# Patient Record
Sex: FEMALE | Race: WHITE | HISPANIC OR LATINO | Employment: FULL TIME | URBAN - METROPOLITAN AREA
[De-identification: names, ages, dates, MRNs, and addresses within clinical notes are randomized per-mention and may not be internally consistent; named-entity substitution may affect disease eponyms.]

---

## 2020-01-17 ENCOUNTER — OFFICE VISIT (OUTPATIENT)
Dept: URGENT CARE | Facility: CLINIC | Age: 50
End: 2020-01-17
Payer: COMMERCIAL

## 2020-01-18 ENCOUNTER — OFFICE VISIT (OUTPATIENT)
Dept: URGENT CARE | Facility: CLINIC | Age: 50
End: 2020-01-18
Payer: COMMERCIAL

## 2020-01-18 VITALS
TEMPERATURE: 98.1 F | BODY MASS INDEX: 43.09 KG/M2 | OXYGEN SATURATION: 96 % | RESPIRATION RATE: 16 BRPM | WEIGHT: 252.4 LBS | DIASTOLIC BLOOD PRESSURE: 82 MMHG | HEIGHT: 64 IN | SYSTOLIC BLOOD PRESSURE: 132 MMHG | HEART RATE: 74 BPM

## 2020-01-18 DIAGNOSIS — J06.9 UPPER RESPIRATORY INFECTION WITH COUGH AND CONGESTION: ICD-10-CM

## 2020-01-18 DIAGNOSIS — J01.10 ACUTE NON-RECURRENT FRONTAL SINUSITIS: ICD-10-CM

## 2020-01-18 PROCEDURE — 99204 OFFICE O/P NEW MOD 45 MIN: CPT | Performed by: NURSE PRACTITIONER

## 2020-01-18 RX ORDER — BENZONATATE 100 MG/1
100 CAPSULE ORAL 3 TIMES DAILY PRN
Qty: 30 CAP | Refills: 0 | Status: SHIPPED | OUTPATIENT
Start: 2020-01-18 | End: 2020-09-29

## 2020-01-18 RX ORDER — FLUTICASONE PROPIONATE 50 MCG
1 SPRAY, SUSPENSION (ML) NASAL 2 TIMES DAILY
Qty: 16 G | Refills: 0 | Status: SHIPPED | OUTPATIENT
Start: 2020-01-18

## 2020-01-18 RX ORDER — CODEINE PHOSPHATE AND GUAIFENESIN 10; 100 MG/5ML; MG/5ML
5-10 SOLUTION ORAL
Qty: 120 ML | Refills: 0 | Status: SHIPPED | OUTPATIENT
Start: 2020-01-18 | End: 2020-01-28

## 2020-01-18 RX ORDER — AMOXICILLIN AND CLAVULANATE POTASSIUM 875; 125 MG/1; MG/1
1 TABLET, FILM COATED ORAL 2 TIMES DAILY
Qty: 14 TAB | Refills: 0 | Status: SHIPPED | OUTPATIENT
Start: 2020-01-18 | End: 2020-01-25

## 2020-01-18 NOTE — PROGRESS NOTES
Chief Complaint   Patient presents with   • Otalgia     x2 weeks, cough, congestion, headache        HISTORY OF PRESENT ILLNESS: Patient is a 49 y.o. female who presents today due to 10 days of nasal congestion, fever, chills, headache, ear pressure, cough, and sinus pressure. She denies associated difficulty breathing, confusion, nausea, vomiting or diarrhea. She has tried OTC cold/sinus medication at home without much improvement. Denies a history of seasonal allergies or sinus infections in the past. No known ill contacts at home. No recent antibiotic usage.     There are no active problems to display for this patient.      Allergies:Patient has no known allergies.    Current Outpatient Medications Ordered in Epic   Medication Sig Dispense Refill   • amoxicillin-clavulanate (AUGMENTIN) 875-125 MG Tab Take 1 Tab by mouth 2 times a day for 7 days. 14 Tab 0   • fluticasone (FLONASE) 50 MCG/ACT nasal spray Spray 1 Spray in nose 2 times a day. 16 g 0   • benzonatate (TESSALON) 100 MG Cap Take 1 Cap by mouth 3 times a day as needed. 30 Cap 0   • guaifenesin-codeine (ROBITUSSIN AC) Solution oral solution Take 5-10 mL by mouth at bedtime as needed for Cough for up to 10 days. Do not drive, work, drink alcohol or engaged in potentially hazardous activity while taking this medication. 120 mL 0     No current Epic-ordered facility-administered medications on file.        History reviewed. No pertinent past medical history.    Social History     Tobacco Use   • Smoking status: Never Smoker   • Smokeless tobacco: Never Used   Substance Use Topics   • Alcohol use: Not on file   • Drug use: Not on file       No family status information on file.   History reviewed. No pertinent family history.    ROS:  Review of Systems   Constitutional: Positive for fever, chills, fatigue. Negative for weight loss.   HENT: Positive for ear pressure, sinus pressure, sore throat, nasal congestion. Negative for ear pain, nosebleeds, neck pain.   "  Eyes: Negative for vision changes.   Neuro: Positive for headache. Negative for sensory changes, weakness, seizure, LOC.  Cardiovascular: Negative for chest pain, palpitations, orthopnea and leg swelling.   Respiratory: Positive for cough. Negative for shortness of breath and wheezing.   Gastrointestinal: Negative for abdominal pain, nausea, vomiting or diarrhea.    Skin: Negative for rash, diaphoresis.     Exam:  /82 (Patient Position: Sitting)   Pulse 74   Temp 36.7 °C (98.1 °F) (Temporal)   Resp 16   Ht 1.626 m (5' 4\")   Wt 114.5 kg (252 lb 6.4 oz)   SpO2 96%   General: well-nourished, well-developed female in NAD  Head: normocephalic, atraumatic  Eyes: PERRLA, no conjunctival injection, acuity grossly intact, lids normal.  Ears: normal shape and symmetry, no tenderness, no discharge. External canals are without any significant edema or erythema. Tympanic membranes are without any inflammation, no effusion. Gross auditory acuity is intact.  Nose: symmetrical without tenderness, erythema and swelling noted bilateral turbinates, clear discharge. Frontal sinus tenderness.   Mouth/Throat: reasonable hygiene, no exudates or tonsillar enlargement. Erythema is present.   Neck: no masses, range of motion within normal limits, no tracheal deviation. No obvious thyroid enlargement.   Lymph: no cervical adenopathy. No supraclavicular adenopathy.   Neuro: alert and oriented. Cranial nerves 1-12 grossly intact. No sensory deficit.   Cardiovascular: regular rate and rhythm. No edema.  Pulmonary: no distress. Chest is symmetrical with respiration, no wheezes, crackles, or rhonchi.   Musculoskeletal: no clubbing, appropriate muscle tone, gait is stable.  Skin: warm, dry, intact, no clubbing, no cyanosis, no rashes.   Psych: appropriate mood, affect, judgement.         Assessment/Plan:  1. Upper respiratory infection with cough and congestion  fluticasone (FLONASE) 50 MCG/ACT nasal spray    benzonatate (TESSALON) " 100 MG Cap    guaifenesin-codeine (ROBITUSSIN AC) Solution oral solution   2. Acute non-recurrent frontal sinusitis  amoxicillin-clavulanate (AUGMENTIN) 875-125 MG Tab    fluticasone (FLONASE) 50 MCG/ACT nasal spray       Suspect URI with secondary sinusitis.  Augmentin and Flonase as directed.  Tessalon Perles and Robitussin-AC for cough, sedative effects of codeine discussed.  Probiotic use encouraged.   Nasal washes with sterile saline solution daily. Sleep with HOB elevated, humidifier at night, rest, increase fluid intake.   Supportive care, differential diagnoses, and indications for immediate follow-up discussed with patient.   Pathogenesis of diagnosis discussed including typical length and natural progression.   Instructed to return to clinic or nearest emergency department for any change in condition, further concerns, or worsening of symptoms.  Patient states understanding of the plan of care and discharge instructions.  Instructed to make an appointment, for follow up, with her primary care provider.        Please note that this dictation was created using voice recognition software. I have made every reasonable attempt to correct obvious errors, but I expect that there are errors of grammar and possibly content that I did not discover before finalizing the note.      ARLENE Chavez.

## 2020-09-19 ENCOUNTER — HOSPITAL ENCOUNTER (OUTPATIENT)
Facility: MEDICAL CENTER | Age: 50
End: 2020-09-19
Attending: PHYSICIAN ASSISTANT
Payer: COMMERCIAL

## 2020-09-19 ENCOUNTER — OFFICE VISIT (OUTPATIENT)
Dept: URGENT CARE | Facility: CLINIC | Age: 50
End: 2020-09-19
Payer: COMMERCIAL

## 2020-09-19 VITALS
HEIGHT: 64 IN | BODY MASS INDEX: 42.68 KG/M2 | SYSTOLIC BLOOD PRESSURE: 120 MMHG | TEMPERATURE: 96.5 F | OXYGEN SATURATION: 96 % | RESPIRATION RATE: 14 BRPM | HEART RATE: 63 BPM | WEIGHT: 250 LBS | DIASTOLIC BLOOD PRESSURE: 100 MMHG

## 2020-09-19 DIAGNOSIS — J02.9 PHARYNGITIS, UNSPECIFIED ETIOLOGY: ICD-10-CM

## 2020-09-19 DIAGNOSIS — R05.9 COUGH: ICD-10-CM

## 2020-09-19 DIAGNOSIS — R09.82 PND (POST-NASAL DRIP): ICD-10-CM

## 2020-09-19 DIAGNOSIS — I10 HYPERTENSION, UNSPECIFIED TYPE: ICD-10-CM

## 2020-09-19 DIAGNOSIS — J98.01 BRONCHOSPASM: ICD-10-CM

## 2020-09-19 LAB
INT CON NEG: NORMAL
INT CON POS: NORMAL
S PYO AG THROAT QL: NORMAL

## 2020-09-19 PROCEDURE — 99214 OFFICE O/P EST MOD 30 MIN: CPT | Performed by: PHYSICIAN ASSISTANT

## 2020-09-19 PROCEDURE — 87880 STREP A ASSAY W/OPTIC: CPT | Performed by: PHYSICIAN ASSISTANT

## 2020-09-19 PROCEDURE — U0003 INFECTIOUS AGENT DETECTION BY NUCLEIC ACID (DNA OR RNA); SEVERE ACUTE RESPIRATORY SYNDROME CORONAVIRUS 2 (SARS-COV-2) (CORONAVIRUS DISEASE [COVID-19]), AMPLIFIED PROBE TECHNIQUE, MAKING USE OF HIGH THROUGHPUT TECHNOLOGIES AS DESCRIBED BY CMS-2020-01-R: HCPCS

## 2020-09-19 RX ORDER — LIDOCAINE HYDROCHLORIDE 20 MG/ML
5 SOLUTION OROPHARYNGEAL PRN
Qty: 120 ML | Refills: 0 | Status: SHIPPED | OUTPATIENT
Start: 2020-09-19 | End: 2020-09-29

## 2020-09-19 RX ORDER — BENZONATATE 100 MG/1
100 CAPSULE ORAL 3 TIMES DAILY PRN
Qty: 60 CAP | Refills: 0 | Status: SHIPPED | OUTPATIENT
Start: 2020-09-19 | End: 2020-09-29

## 2020-09-19 RX ORDER — ALBUTEROL SULFATE 90 UG/1
2 AEROSOL, METERED RESPIRATORY (INHALATION) EVERY 6 HOURS PRN
Qty: 8.5 G | Refills: 2 | Status: SHIPPED | OUTPATIENT
Start: 2020-09-19 | End: 2020-09-29

## 2020-09-19 RX ORDER — ONDANSETRON 4 MG/1
4 TABLET, FILM COATED ORAL EVERY 6 HOURS PRN
Qty: 20 TAB | Refills: 1 | Status: SHIPPED | OUTPATIENT
Start: 2020-09-19 | End: 2020-09-29

## 2020-09-19 ASSESSMENT — ENCOUNTER SYMPTOMS
WHEEZING: 1
SPUTUM PRODUCTION: 0
CHILLS: 1
ABDOMINAL PAIN: 0
FEVER: 0
SORE THROAT: 1
DIARRHEA: 0
VOMITING: 0
NAUSEA: 0
COUGH: 1
SINUS PAIN: 0
SHORTNESS OF BREATH: 0

## 2020-09-19 NOTE — LETTER
September 19, 2020       Patient: Jessica Ham   YOB: 1970   Date of Visit: 9/19/2020       To Whom it May Concern,   Your employee was seen in our clinic today. A concern for COVID-19 has been identified and testing is in progress.?   ?  We are asking you to excuse absences while following self-isolation protocol per Center for Disease Control (CDC) guidelines. Your employee will be able to access test results through our electronic delivery system called [employer].?   ?  If the results of testing are negative, and once there has been no fever (temperature >100.4 F) for at least 72 hours without treatment, and no vomiting or diarrhea for at least 48 hours, then return to work is approved.   ?  If the results of testing are positive then your employee will be contacted by the [facility] or [facility] department for further instructions on duration of self-isolation and return to work protocol. In general, this will also follow the CDC guidelines with a minimum of 10 days from the onset of symptoms and without fever, vomiting, or diarrhea as above.?   ?  In general, repeat testing is not necessary and not offered through our [facility] care.?   ?  This is the only note that will be provided from [facility] for this visit. Your employee will require an appointment with a primary care provider if FMLA or disability forms are required.   ?  Sincerely,?     Joel Nicole P.A.-C.  Electronically Signed

## 2020-09-19 NOTE — PROGRESS NOTES
"Subjective:   Jessica Ham  is a 50 y.o. female who presents for Sore Throat ( light cough, dizzie, nausea, no fever, x 9 days yesterday was her worse day, last night laying down is good, but getting up she feels pain )      HPI    Patient comes clinic complaining last 8 to 9 days of noting sore throat worse in the morning, light cough with trace wheezing.  Notes some sinus congestion causing intermittent dizziness.  Notes morning time nausea without vomiting.  She denies fever but notes some fatigue.  She denies change in smell but has had some \"moments of abnormal taste\".  She denies known exposure to strep or COVID.  She states she did leave the home just over a week ago to go shopping but besides that has been very diligent avoiding exposure to others.  She denies history of asthma bronchitis or pneumonia.  She denies historical use of MDI.  She denies abdominal pain diarrhea or rash.  She has been taking Zyrtec.    Review of Systems   Constitutional: Positive for chills and malaise/fatigue. Negative for fever.   HENT: Positive for congestion and sore throat. Negative for ear pain and sinus pain.    Respiratory: Positive for cough and wheezing. Negative for sputum production and shortness of breath.    Gastrointestinal: Negative for abdominal pain, diarrhea, nausea and vomiting.   Skin: Negative for rash.   Endo/Heme/Allergies: Positive for environmental allergies.       No Known Allergies     Objective:   /100   Pulse 63   Temp 35.8 °C (96.5 °F) (Temporal)   Resp 14   Ht 1.626 m (5' 4\")   Wt 113.4 kg (250 lb)   SpO2 96%   BMI 42.91 kg/m²     Physical Exam  Vitals signs and nursing note reviewed.   Constitutional:       General: She is not in acute distress.     Appearance: She is well-developed. She is not diaphoretic.   HENT:      Head: Normocephalic and atraumatic.      Right Ear: Tympanic membrane, ear canal and external ear normal.      Left Ear: Tympanic membrane, ear canal and " external ear normal.      Nose: Nose normal.      Right Sinus: No maxillary sinus tenderness or frontal sinus tenderness.      Left Sinus: No maxillary sinus tenderness or frontal sinus tenderness.      Mouth/Throat:      Lips: Pink.      Mouth: Mucous membranes are moist.      Pharynx: Uvula midline. Posterior oropharyngeal erythema ( mild PND) present. No oropharyngeal exudate.      Tonsils: No tonsillar abscesses.   Eyes:      General: Lids are normal. No scleral icterus.        Right eye: No discharge.         Left eye: No discharge.      Conjunctiva/sclera: Conjunctivae normal.      Pupils: Pupils are equal, round, and reactive to light.   Neck:      Musculoskeletal: Neck supple.   Pulmonary:      Effort: Pulmonary effort is normal. No respiratory distress.      Breath sounds: No decreased breath sounds, wheezing, rhonchi or rales.   Musculoskeletal: Normal range of motion.   Lymphadenopathy:      Cervical: Cervical adenopathy ( mild bilat) present.   Skin:     General: Skin is warm and dry.      Coloration: Skin is not pale.      Findings: No erythema.   Neurological:      Mental Status: She is alert and oriented to person, place, and time. She is not disoriented.   Psychiatric:         Speech: Speech normal.         Behavior: Behavior normal.     POCT strep - NEG  COVID pending    Assessment/Plan:   1. Pharyngitis, unspecified etiology  - POCT Rapid Strep A  - COVID/SARS COV-2 PCR; Future  - lidocaine (XYLOCAINE) 2 % Solution; Take 5 mL by mouth as needed for Throat/Mouth Pain (q6hr PRN throat pain, ok to rinse and spit or swallow).  Dispense: 120 mL; Refill: 0    2. Cough  - benzonatate (TESSALON) 100 MG Cap; Take 1 Cap by mouth 3 times a day as needed for Cough.  Dispense: 60 Cap; Refill: 0    3. PND (post-nasal drip)  - ondansetron (ZOFRAN) 4 MG Tab tablet; Take 1 Tab by mouth every 6 hours as needed for Nausea/Vomiting.  Dispense: 20 Tab; Refill: 1    4. Bronchospasm  - albuterol 108 (90 Base) MCG/ACT  Aero Soln inhalation aerosol; Inhale 2 Puffs by mouth every 6 hours as needed for Shortness of Breath.  Dispense: 8.5 g; Refill: 2  Supportive care is reviewed with patient/caregiver - recommend to push PO fluids and electrolytes, over-the-counter symptom support medications reviewed, ER precautions with worsened symptoms, quarantine recommendations reviewed, sent with letter, MyChart for results of testing  Return to clinic with lack of resolution or progression of symptoms.  ER precautions with any worsening symptoms are reviewed with patient/caregiver and they do express understanding      I have worn an N95 mask, gloves and eye protection for the entire encounter with this patient.     Differential diagnosis, natural history, supportive care, and indications for immediate follow-up discussed.

## 2020-09-20 DIAGNOSIS — J02.9 PHARYNGITIS, UNSPECIFIED ETIOLOGY: ICD-10-CM

## 2020-09-20 LAB
COVID ORDER STATUS COVID19: NORMAL
SARS-COV-2 RNA RESP QL NAA+PROBE: NOTDETECTED
SPECIMEN SOURCE: NORMAL

## 2020-09-22 ENCOUNTER — TELEPHONE (OUTPATIENT)
Dept: SCHEDULING | Facility: IMAGING CENTER | Age: 50
End: 2020-09-22

## 2020-09-29 ENCOUNTER — OFFICE VISIT (OUTPATIENT)
Dept: MEDICAL GROUP | Age: 50
End: 2020-09-29
Payer: COMMERCIAL

## 2020-09-29 VITALS
HEART RATE: 86 BPM | BODY MASS INDEX: 43.71 KG/M2 | HEIGHT: 64 IN | OXYGEN SATURATION: 97 % | TEMPERATURE: 97.1 F | SYSTOLIC BLOOD PRESSURE: 122 MMHG | WEIGHT: 256 LBS | DIASTOLIC BLOOD PRESSURE: 98 MMHG

## 2020-09-29 DIAGNOSIS — M79.89 LEG SWELLING: ICD-10-CM

## 2020-09-29 DIAGNOSIS — E78.5 DYSLIPIDEMIA: ICD-10-CM

## 2020-09-29 DIAGNOSIS — Z71.85 IMMUNIZATION COUNSELING: ICD-10-CM

## 2020-09-29 DIAGNOSIS — I10 ESSENTIAL HYPERTENSION: ICD-10-CM

## 2020-09-29 DIAGNOSIS — R53.83 FATIGUE, UNSPECIFIED TYPE: ICD-10-CM

## 2020-09-29 DIAGNOSIS — Z00.00 ANNUAL PHYSICAL EXAM: ICD-10-CM

## 2020-09-29 DIAGNOSIS — R73.01 IFG (IMPAIRED FASTING GLUCOSE): ICD-10-CM

## 2020-09-29 DIAGNOSIS — Z00.00 HEALTH CARE MAINTENANCE: ICD-10-CM

## 2020-09-29 DIAGNOSIS — E55.9 HYPOVITAMINOSIS D: ICD-10-CM

## 2020-09-29 PROCEDURE — 99386 PREV VISIT NEW AGE 40-64: CPT | Performed by: INTERNAL MEDICINE

## 2020-09-29 PROCEDURE — 99214 OFFICE O/P EST MOD 30 MIN: CPT | Mod: 25 | Performed by: INTERNAL MEDICINE

## 2020-09-29 RX ORDER — HYDROCHLOROTHIAZIDE 25 MG/1
25 TABLET ORAL DAILY
Qty: 30 TAB | Refills: 0 | Status: SHIPPED | OUTPATIENT
Start: 2020-09-29 | End: 2020-10-23 | Stop reason: SDUPTHER

## 2020-09-29 ASSESSMENT — PATIENT HEALTH QUESTIONNAIRE - PHQ9: CLINICAL INTERPRETATION OF PHQ2 SCORE: 0

## 2020-09-29 NOTE — PROGRESS NOTES
CHIEF COMPLAINT  Chief Complaint   Patient presents with   • Establish Care   HTN, labs    HPI  Jessica Ham is a 50 y.o. female who presents today for the following     HCM  Recommendations:  Regular exercise at least 4 days a week  Diet: advised balanced diet  Dental exam at least 1-2 times per year  Sunscreen use: advised    Immunization counseling:  TdaP:  due  Influenza: due, NA in office    GYN  St post hysterectomy in 2017, - no cancer  Last Mammography: pending    Hypertensio, LE swelling  Meds: probably dieuretic, stopped in 1/2020.  C/o water retention, LE swelling.  Denies:  -  headaches, vision problems, tinnitus.                 -  chest pain/pressure, palpitations, irregular heart beats, exertional, dyspnea, peripheral edema.  Low salt diet: advised  Diet: advised low lester  Exercise: advised daily  BMI: 43  FH of HTN: mother    Dyslipidemia  The patient had abnormal lipid panel, no medications.  Diet /Exercise/BMI:  As above  FH: mother    IFG  The patient had elevated FBG, and A1c.  No polydipsia, polyphagia, polyuria.  No abdominal pain, weight loss, fatigue.  Diet/exercise/BMI: As above  She was on metformin in the past.  FH of DM: multiple    Hypovitaminosis D  The patient had low vitamin D level.  Vitamin D supplement: no.    Obesity, Body mass index is 43.94 kg/m².  Onset: since her 20'  Diet: regular  Exercise: insufficient  No temperature intolerance. No change in hair/skin quality, BMs.   No HTN, buffalo hump, purple striae, flushing.  FH of obesity: sisters x 2, mother    Reviewed PMH, PSH, FH, SH, ALL, HCM/IMM  Reviewed MEDS    Patient Active Problem List    Diagnosis Date Noted   • Health care maintenance 09/29/2020   • Immunization counseling 09/29/2020   • Essential hypertension 09/29/2020   • Dyslipidemia 09/29/2020   • IFG (impaired fasting glucose) 09/29/2020   • BMI 40.0-44.9, adult (HCC) 09/29/2020     CURRENT MEDICATIONS  Current Outpatient Medications   Medication Sig  "Dispense Refill   • fluticasone (FLONASE) 50 MCG/ACT nasal spray Spray 1 Spray in nose 2 times a day. 16 g 0     No current facility-administered medications for this visit.      ALLERGIES  Allergies: Patient has no known allergies.  PAST MEDICAL HISTORY  Past Medical History:   Diagnosis Date   • Allergy    • Anxiety    • Hypertension    • Overweight    • RAD (reactive airway disease)      SURGICAL HISTORY  She  has a past surgical history that includes abdominal hysterectomy total.  SOCIAL HISTORY  Social History     Tobacco Use   • Smoking status: Never Smoker   • Smokeless tobacco: Never Used   Substance Use Topics   • Alcohol use: Not on file   • Drug use: Not on file     Social History     Social History Narrative   • Not on file     FAMILY HISTORY  Family History   Problem Relation Age of Onset   • Diabetes Mother    • Hypertension Mother    • Hyperlipidemia Mother    • Diabetes Sister    • Diabetes Brother      Family Status   Relation Name Status   • Mo  (Not Specified)   • Sis  (Not Specified)   • Bro  (Not Specified)     ROS   Constitutional: Negative for fever, chills, fatigue.  HENT: Negative for congestion, sore throat.  Eyes: Negative for vision problems.   Respiratory: Negative for cough, shortness of breath.  Cardiovascular: Negative for chest pain, palpitations.   Gastrointestinal: Negative for heartburn, nausea, abdominal pain.   Genitourinary: Negative for dysuria.  Musculoskeletal: Negative for significant myalgia, back and joint pain.   Skin: Negative for rash.   Neuro: Negative for dizziness, weakness and headaches.   Endo/Heme/Allergies: Does not bruise/bleed easily.   Psychiatric/Behavioral: Negative for depression.    PHYSICAL EXAM   Blood Pressure 122/98 (BP Location: Left arm, Patient Position: Sitting, BP Cuff Size: Adult)   Pulse 86   Temperature 36.2 °C (97.1 °F) (Temporal)   Height 1.626 m (5' 4\")   Weight 116.1 kg (256 lb)   Oxygen Saturation 97%  Body mass index is 43.94 " kg/m².  General:  NAD, well appearing  HEENT:   NC/AT, PERRLA, EOMI.  Cardiovascular: unlabored breathing, no peripheral cyanosis or swelling.     Lungs:   no respiratory distress.  Abdomen: non- distended.  Extremities:  B/L ankle swelling.  Skin:  Warm, dry.  No erythema. No rash.   Neurologic: Alert & oriented x 3. CN II-XII grossly intact. No focal deficits.  Psychiatric:  Affect normal, mood normal, judgment normal.    Labs     Labs are reviewed and discussed with a patient    Covid test, 9/19/20: negative    Imaging     None    Assessment and Plan     Jessica Ham is a 50 y.o. female    1. Annual physical exam  Reviewed PMH, PSH, FH, SH, ALL, MEDS, HCM/IMM.   Advised healthy habits, diet, exercise.    2. Health care maintenance  3. Immunization counseling  Per HPI    4. Essential hypertension  High DBP, start HCTZ  - REFERRAL TO [facility] (HIP) Services Requested: Registered Dietitian for Medical Nutrition Therapy; Reason for Visit: Overweight/Obesity, BMI of 25 or higher and A1C of 5.7-6.4  - Comp Metabolic Panel; Future  - hydroCHLOROthiazide (HYDRODIURIL) 25 MG Tab; Take 1 Tab by mouth every day.  Dispense: 30 Tab; Refill: 0  5. Leg swelling  - hydroCHLOROthiazide (HYDRODIURIL) 25 MG Tab; Take 1 Tab by mouth every day.  Dispense: 30 Tab; Refill: 0    6. Dyslipidemia  Pending labs, advised low-calorie diet, daily exercise, weight control  - REFERRAL TO [facility] (HIP) Services Requested: Registered Dietitian for Medical Nutrition Therapy; Reason for Visit: Overweight/Obesity, BMI of 25 or higher and A1C of 5.7-6.4  - Comp Metabolic Panel; Future  - Lipid Profile; Future    7. IFG (impaired fasting glucose)  Discussed about risk to develop DM.   Advised low carb diet, exercise, watch for WT.   - REFERRAL TO [facility] (HIP) Services Requested: Registered Dietitian for Medical Nutrition Therapy; Reason for Visit:  Overweight/Obesity, BMI of 25 or higher and A1C of 5.7-6.4  - Comp Metabolic Panel; Future    8. Hypovitaminosis D  Pending labs  - VITAMIN D,25 HYDROXY; Future    9. Fatigue, unspecified type  Pending labs  - CBC WITH DIFFERENTIAL; Future  - TSH WITH REFLEX TO FT4; Future    10. BMI 40.0-44.9, adult (HCC)  Requested referral for nutritionist  - REFERRAL TO [facility] (HIP) Services Requested: Registered Dietitian for Medical Nutrition Therapy; Reason for Visit: Overweight/Obesity, BMI of 25 or higher and A1C of 5.7-6.4    Counseling:   - Smoking:  Nonsmoker    Followup: within 4 weeks, labs, EKG, pelvic exam    All questions are answered.    Please note that this dictation was created using voice recognition software, and that there might be errors of dung and possibly content.

## 2020-10-03 ENCOUNTER — HOSPITAL ENCOUNTER (OUTPATIENT)
Dept: RADIOLOGY | Facility: MEDICAL CENTER | Age: 50
End: 2020-10-03
Attending: INTERNAL MEDICINE
Payer: COMMERCIAL

## 2020-10-03 ENCOUNTER — HOSPITAL ENCOUNTER (OUTPATIENT)
Dept: LAB | Facility: MEDICAL CENTER | Age: 50
End: 2020-10-03
Attending: INTERNAL MEDICINE
Payer: COMMERCIAL

## 2020-10-03 DIAGNOSIS — R53.83 FATIGUE, UNSPECIFIED TYPE: ICD-10-CM

## 2020-10-03 DIAGNOSIS — I10 ESSENTIAL HYPERTENSION: ICD-10-CM

## 2020-10-03 DIAGNOSIS — R73.01 IFG (IMPAIRED FASTING GLUCOSE): ICD-10-CM

## 2020-10-03 DIAGNOSIS — E55.9 HYPOVITAMINOSIS D: ICD-10-CM

## 2020-10-03 DIAGNOSIS — E78.5 DYSLIPIDEMIA: ICD-10-CM

## 2020-10-03 DIAGNOSIS — Z12.31 VISIT FOR SCREENING MAMMOGRAM: ICD-10-CM

## 2020-10-03 LAB
25(OH)D3 SERPL-MCNC: 16 NG/ML (ref 30–100)
ALBUMIN SERPL BCP-MCNC: 4.2 G/DL (ref 3.2–4.9)
ALBUMIN/GLOB SERPL: 1.2 G/DL
ALP SERPL-CCNC: 74 U/L (ref 30–99)
ALT SERPL-CCNC: 73 U/L (ref 2–50)
ANION GAP SERPL CALC-SCNC: 14 MMOL/L (ref 7–16)
AST SERPL-CCNC: 55 U/L (ref 12–45)
BASOPHILS # BLD AUTO: 0.9 % (ref 0–1.8)
BASOPHILS # BLD: 0.07 K/UL (ref 0–0.12)
BILIRUB SERPL-MCNC: 0.7 MG/DL (ref 0.1–1.5)
BUN SERPL-MCNC: 15 MG/DL (ref 8–22)
CALCIUM SERPL-MCNC: 9.2 MG/DL (ref 8.5–10.5)
CHLORIDE SERPL-SCNC: 100 MMOL/L (ref 96–112)
CHOLEST SERPL-MCNC: 171 MG/DL (ref 100–199)
CO2 SERPL-SCNC: 26 MMOL/L (ref 20–33)
CREAT SERPL-MCNC: 0.82 MG/DL (ref 0.5–1.4)
EOSINOPHIL # BLD AUTO: 0.12 K/UL (ref 0–0.51)
EOSINOPHIL NFR BLD: 1.5 % (ref 0–6.9)
ERYTHROCYTE [DISTWIDTH] IN BLOOD BY AUTOMATED COUNT: 43.9 FL (ref 35.9–50)
FASTING STATUS PATIENT QL REPORTED: NORMAL
GLOBULIN SER CALC-MCNC: 3.4 G/DL (ref 1.9–3.5)
GLUCOSE SERPL-MCNC: 108 MG/DL (ref 65–99)
HCT VFR BLD AUTO: 47.2 % (ref 37–47)
HDLC SERPL-MCNC: 37 MG/DL
HGB BLD-MCNC: 16.2 G/DL (ref 12–16)
IMM GRANULOCYTES # BLD AUTO: 0.03 K/UL (ref 0–0.11)
IMM GRANULOCYTES NFR BLD AUTO: 0.4 % (ref 0–0.9)
LDLC SERPL CALC-MCNC: 101 MG/DL
LYMPHOCYTES # BLD AUTO: 2.1 K/UL (ref 1–4.8)
LYMPHOCYTES NFR BLD: 26.9 % (ref 22–41)
MCH RBC QN AUTO: 30.5 PG (ref 27–33)
MCHC RBC AUTO-ENTMCNC: 34.3 G/DL (ref 33.6–35)
MCV RBC AUTO: 88.9 FL (ref 81.4–97.8)
MONOCYTES # BLD AUTO: 0.88 K/UL (ref 0–0.85)
MONOCYTES NFR BLD AUTO: 11.3 % (ref 0–13.4)
NEUTROPHILS # BLD AUTO: 4.62 K/UL (ref 2–7.15)
NEUTROPHILS NFR BLD: 59 % (ref 44–72)
NRBC # BLD AUTO: 0 K/UL
NRBC BLD-RTO: 0 /100 WBC
PLATELET # BLD AUTO: 354 K/UL (ref 164–446)
PMV BLD AUTO: 10.2 FL (ref 9–12.9)
POTASSIUM SERPL-SCNC: 4 MMOL/L (ref 3.6–5.5)
PROT SERPL-MCNC: 7.6 G/DL (ref 6–8.2)
RBC # BLD AUTO: 5.31 M/UL (ref 4.2–5.4)
SODIUM SERPL-SCNC: 140 MMOL/L (ref 135–145)
TRIGL SERPL-MCNC: 166 MG/DL (ref 0–149)
TSH SERPL DL<=0.005 MIU/L-ACNC: 0.96 UIU/ML (ref 0.38–5.33)
WBC # BLD AUTO: 7.8 K/UL (ref 4.8–10.8)

## 2020-10-03 PROCEDURE — 80061 LIPID PANEL: CPT

## 2020-10-03 PROCEDURE — 85025 COMPLETE CBC W/AUTO DIFF WBC: CPT

## 2020-10-03 PROCEDURE — 82306 VITAMIN D 25 HYDROXY: CPT

## 2020-10-03 PROCEDURE — 36415 COLL VENOUS BLD VENIPUNCTURE: CPT

## 2020-10-03 PROCEDURE — 84443 ASSAY THYROID STIM HORMONE: CPT

## 2020-10-03 PROCEDURE — 80053 COMPREHEN METABOLIC PANEL: CPT

## 2020-10-03 PROCEDURE — 77067 SCR MAMMO BI INCL CAD: CPT

## 2020-10-06 ENCOUNTER — HOSPITAL ENCOUNTER (OUTPATIENT)
Dept: RADIOLOGY | Facility: MEDICAL CENTER | Age: 50
End: 2020-10-06
Attending: INTERNAL MEDICINE
Payer: COMMERCIAL

## 2020-10-06 DIAGNOSIS — R92.8 ABNORMAL MAMMOGRAM: ICD-10-CM

## 2020-10-06 PROCEDURE — 76642 ULTRASOUND BREAST LIMITED: CPT | Mod: LT

## 2020-10-23 ENCOUNTER — HOSPITAL ENCOUNTER (OUTPATIENT)
Dept: LAB | Facility: MEDICAL CENTER | Age: 50
End: 2020-10-23
Attending: INTERNAL MEDICINE
Payer: COMMERCIAL

## 2020-10-23 ENCOUNTER — OFFICE VISIT (OUTPATIENT)
Dept: MEDICAL GROUP | Age: 50
End: 2020-10-23
Payer: COMMERCIAL

## 2020-10-23 VITALS
HEIGHT: 64 IN | DIASTOLIC BLOOD PRESSURE: 86 MMHG | HEART RATE: 86 BPM | TEMPERATURE: 97.6 F | WEIGHT: 253 LBS | BODY MASS INDEX: 43.19 KG/M2 | OXYGEN SATURATION: 96 % | SYSTOLIC BLOOD PRESSURE: 118 MMHG

## 2020-10-23 DIAGNOSIS — E55.9 HYPOVITAMINOSIS D: ICD-10-CM

## 2020-10-23 DIAGNOSIS — R74.01 TRANSAMINITIS: ICD-10-CM

## 2020-10-23 DIAGNOSIS — E78.5 DYSLIPIDEMIA: ICD-10-CM

## 2020-10-23 DIAGNOSIS — Z00.00 HEALTH CARE MAINTENANCE: ICD-10-CM

## 2020-10-23 DIAGNOSIS — I10 ESSENTIAL HYPERTENSION: ICD-10-CM

## 2020-10-23 DIAGNOSIS — I51.7 LVH (LEFT VENTRICULAR HYPERTROPHY): ICD-10-CM

## 2020-10-23 DIAGNOSIS — Z23 NEEDS FLU SHOT: ICD-10-CM

## 2020-10-23 DIAGNOSIS — R73.01 IFG (IMPAIRED FASTING GLUCOSE): ICD-10-CM

## 2020-10-23 DIAGNOSIS — R00.2 PALPITATIONS: ICD-10-CM

## 2020-10-23 DIAGNOSIS — R07.9 CHEST PAIN, UNSPECIFIED TYPE: ICD-10-CM

## 2020-10-23 DIAGNOSIS — M79.89 LEG SWELLING: ICD-10-CM

## 2020-10-23 LAB
25(OH)D3 SERPL-MCNC: 23 NG/ML (ref 30–100)
ALBUMIN SERPL BCP-MCNC: 4.2 G/DL (ref 3.2–4.9)
ALBUMIN/GLOB SERPL: 1.3 G/DL
ALP SERPL-CCNC: 69 U/L (ref 30–99)
ALT SERPL-CCNC: 82 U/L (ref 2–50)
ANION GAP SERPL CALC-SCNC: 11 MMOL/L (ref 7–16)
AST SERPL-CCNC: 52 U/L (ref 12–45)
BILIRUB SERPL-MCNC: 1.1 MG/DL (ref 0.1–1.5)
BUN SERPL-MCNC: 15 MG/DL (ref 8–22)
CALCIUM SERPL-MCNC: 9.8 MG/DL (ref 8.5–10.5)
CHLORIDE SERPL-SCNC: 101 MMOL/L (ref 96–112)
CHOLEST SERPL-MCNC: 156 MG/DL (ref 100–199)
CO2 SERPL-SCNC: 29 MMOL/L (ref 20–33)
CREAT SERPL-MCNC: 0.77 MG/DL (ref 0.5–1.4)
FASTING STATUS PATIENT QL REPORTED: NORMAL
GGT SERPL-CCNC: 25 U/L (ref 7–34)
GLOBULIN SER CALC-MCNC: 3.2 G/DL (ref 1.9–3.5)
GLUCOSE SERPL-MCNC: 132 MG/DL (ref 65–99)
HDLC SERPL-MCNC: 37 MG/DL
LDLC SERPL CALC-MCNC: 88 MG/DL
POTASSIUM SERPL-SCNC: 3.2 MMOL/L (ref 3.6–5.5)
PROT SERPL-MCNC: 7.4 G/DL (ref 6–8.2)
SODIUM SERPL-SCNC: 141 MMOL/L (ref 135–145)
TRIGL SERPL-MCNC: 153 MG/DL (ref 0–149)

## 2020-10-23 PROCEDURE — 82306 VITAMIN D 25 HYDROXY: CPT

## 2020-10-23 PROCEDURE — 93000 ELECTROCARDIOGRAM COMPLETE: CPT | Performed by: INTERNAL MEDICINE

## 2020-10-23 PROCEDURE — 80061 LIPID PANEL: CPT

## 2020-10-23 PROCEDURE — 80053 COMPREHEN METABOLIC PANEL: CPT

## 2020-10-23 PROCEDURE — 90686 IIV4 VACC NO PRSV 0.5 ML IM: CPT | Performed by: INTERNAL MEDICINE

## 2020-10-23 PROCEDURE — 99214 OFFICE O/P EST MOD 30 MIN: CPT | Mod: 25 | Performed by: INTERNAL MEDICINE

## 2020-10-23 PROCEDURE — 82977 ASSAY OF GGT: CPT

## 2020-10-23 PROCEDURE — 36415 COLL VENOUS BLD VENIPUNCTURE: CPT

## 2020-10-23 PROCEDURE — 90471 IMMUNIZATION ADMIN: CPT | Performed by: INTERNAL MEDICINE

## 2020-10-23 RX ORDER — HYDROCHLOROTHIAZIDE 25 MG/1
25 TABLET ORAL DAILY
Qty: 90 TAB | Refills: 1 | Status: SHIPPED | OUTPATIENT
Start: 2020-10-23 | End: 2021-03-30 | Stop reason: SDUPTHER

## 2020-10-23 ASSESSMENT — FIBROSIS 4 INDEX: FIB4 SCORE: 0.89

## 2020-10-23 NOTE — PROGRESS NOTES
CHIEF COMPLAINT  Chief Complaint   Patient presents with   • Gynecologic Exam   • Other     do EKG   • Lab Results       HPI  Jessica Ham is a 50 y.o. female who presents today for the following     HCM  Recommendations:  Regular exercise at least 4 days a week  Diet: advised balanced diet  Dental exam at least 1-2 times per year  Sunscreen use: advised     Immunization counseling:  TdaP:  due  Influenza: due, NA in office     GYN  St post hysterectomy in 2017, - no cancer  Last Mammography: pending     Hypertensio, LE swelling  Meds: probably dieuretic, stopped in 1/2020.  C/o water retention, LE swelling.  Denies:  -  headaches, vision problems, tinnitus.                 -  chest pain/pressure, palpitations, irregular heart beats, exertional, dyspnea, peripheral edema.  Low salt diet: advised  Diet: advised low lester  Exercise: advised daily  BMI: 43  FH of HTN: mother    CP, palpitations  51 y/o, F, h/o HTN, dyslipidemia, IFG, f/h of heart problems in parents (father afib)    C/o intermittent precordial CP for > 1 year, has been  - dull  - in rest mostly, occasionally in activity  - max 8/10  - lasting max 2 hrs - awaking from sleep  - improving after she started HCTZ  - without lightheadedness, dizziness    Cardiac risk factors:  Nonsmoker.  BMI: 43  Sedentary lifestyle.  Comorbidities: HTN, dyslipidemia, IFG  Positive FH of heart problems     Dyslipidemia, transaminitis  The patient had abnormal lipid panel, no medications.  Diet /Exercise/BMI:  As above  FH: mother  LFTs showed slight elevation of liver enzymes.     IFG  The patient had elevated FBG, and A1c.  No polydipsia, polyphagia, polyuria.  No abdominal pain, weight loss, fatigue.  Diet/exercise/BMI: As above  She was on metformin in the past.  FH of DM: multiple     Hypovitaminosis D  The patient had low vitamin D level.  Vitamin D supplement: no.     Reviewed PMH, PSH, FH, SH, ALL, HCM/IMM, no changes  Reviewed MEDS, no changes    Patient  Active Problem List    Diagnosis Date Noted   • Health care maintenance 09/29/2020   • Immunization counseling 09/29/2020   • Essential hypertension 09/29/2020   • Dyslipidemia 09/29/2020   • IFG (impaired fasting glucose) 09/29/2020   • BMI 40.0-44.9, adult (HCC) 09/29/2020   • Hypovitaminosis D 09/29/2020     CURRENT MEDICATIONS  Current Outpatient Medications   Medication Sig Dispense Refill   • Cholecalciferol (VITAMIN D3 PO) Take 1,000 mg by mouth. Take 2 tablets a day     • hydroCHLOROthiazide (HYDRODIURIL) 25 MG Tab Take 1 Tab by mouth every day. 30 Tab 0   • fluticasone (FLONASE) 50 MCG/ACT nasal spray Spray 1 Spray in nose 2 times a day. 16 g 0     No current facility-administered medications for this visit.      ALLERGIES  Allergies: Patient has no known allergies.  PAST MEDICAL HISTORY  Past Medical History:   Diagnosis Date   • Allergy    • Anxiety    • Hypertension    • Overweight    • RAD (reactive airway disease)      SURGICAL HISTORY  She  has a past surgical history that includes abdominal hysterectomy total and primary c section.  SOCIAL HISTORY  Social History     Tobacco Use   • Smoking status: Never Smoker   • Smokeless tobacco: Never Used   Substance Use Topics   • Alcohol use: Not on file   • Drug use: Not on file     Social History     Social History Narrative   • Not on file     FAMILY HISTORY  Family History   Problem Relation Age of Onset   • Diabetes Mother    • Hypertension Mother    • Hyperlipidemia Mother    • Diabetes Sister    • Diabetes Brother      Family Status   Relation Name Status   • Mo  (Not Specified)   • Sis  (Not Specified)   • Bro  (Not Specified)     ROS   Constitutional: Negative for fever, chills, fatigue.  HENT: Negative for congestion, sore throat.  Eyes: Negative for vision problems.   Respiratory: Negative for cough, shortness of breath.  Cardiovascular: as above.   Gastrointestinal: Negative for heartburn, nausea, abdominal pain.   Genitourinary: Negative for  "dysuria.  Musculoskeletal: Negative for significant myalgia, back and joint pain.   Skin: Negative for rash.   Neuro: Negative for dizziness, weakness and headaches.   Endo/Heme/Allergies: Does not bruise/bleed easily.   Psychiatric/Behavioral: Negative for depression.    PHYSICAL EXAM   Blood Pressure 118/86 (BP Location: Right arm, Patient Position: Sitting, BP Cuff Size: Adult long)   Pulse 86   Temperature 36.4 °C (97.6 °F) (Temporal)   Height 1.626 m (5' 4\")   Weight 114.8 kg (253 lb)   Oxygen Saturation 96%  Body mass index is 43.43 kg/m².  General:  NAD, well appearing  HEENT:   NC/AT, PERRLA, EOMI.  Cardiovascular: unlabored breathing, no peripheral cyanosis or swelling.     Lungs:   no respiratory distress.  Abdomen: non- distended.  Extremities:  No LE swelling.  Skin:  Warm, dry.  No erythema. No rash.   Neurologic: Alert & oriented x 3. CN II-XII grossly intact. No focal deficits.  Psychiatric:  Affect normal, mood normal, judgment normal.      EKG   By my interpretation today:  Rhythm: normal sinus   Rate: normal   Axis: Left   Ectopy: none   Conduction: normal   ST Segments: no acute change   T Waves: no acute change   Q Waves: none     Labs     Labs are reviewed and discussed with a patient  Lab Results   Component Value Date/Time    CHOLSTRLTOT 171 10/03/2020 12:01 PM     (H) 10/03/2020 12:01 PM    HDL 37 (A) 10/03/2020 12:01 PM    TRIGLYCERIDE 166 (H) 10/03/2020 12:01 PM       Lab Results   Component Value Date/Time    SODIUM 140 10/03/2020 12:01 PM    POTASSIUM 4.0 10/03/2020 12:01 PM    CHLORIDE 100 10/03/2020 12:01 PM    CO2 26 10/03/2020 12:01 PM    GLUCOSE 108 (H) 10/03/2020 12:01 PM    BUN 15 10/03/2020 12:01 PM    CREATININE 0.82 10/03/2020 12:01 PM     Lab Results   Component Value Date/Time    ALKPHOSPHAT 74 10/03/2020 12:01 PM    ASTSGOT 55 (H) 10/03/2020 12:01 PM    ALTSGPT 73 (H) 10/03/2020 12:01 PM    TBILIRUBIN 0.7 10/03/2020 12:01 PM      No results found for: HBA1C  No " results found for: TSH  No results found for: FREET4    Lab Results   Component Value Date/Time    WBC 7.8 10/03/2020 12:01 PM    RBC 5.31 10/03/2020 12:01 PM    HEMOGLOBIN 16.2 (H) 10/03/2020 12:01 PM    HEMATOCRIT 47.2 (H) 10/03/2020 12:01 PM    MCV 88.9 10/03/2020 12:01 PM    MCH 30.5 10/03/2020 12:01 PM    MCHC 34.3 10/03/2020 12:01 PM    MPV 10.2 10/03/2020 12:01 PM    NEUTSPOLYS 59.00 10/03/2020 12:01 PM    LYMPHOCYTES 26.90 10/03/2020 12:01 PM    MONOCYTES 11.30 10/03/2020 12:01 PM    EOSINOPHILS 1.50 10/03/2020 12:01 PM    BASOPHILS 0.90 10/03/2020 12:01 PM      Imaging     None    Assessment and Plan     Jessica Ham is a 50 y.o. female    1. Essential hypertension  Controlled, continue with current treatment.  - hydroCHLOROthiazide (HYDRODIURIL) 25 MG Tab; Take 1 Tab by mouth every day.  Dispense: 90 Tab; Refill: 1  - Comp Metabolic Panel; Future  2. Leg swelling  - hydroCHLOROthiazide (HYDRODIURIL) 25 MG Tab; Take 1 Tab by mouth every day.  Dispense: 90 Tab; Refill: 1    3. LVH (left ventricular hypertrophy)  Found on EKG; BP is currently controlled / new pt    4. Chest pain, unspecified type  EKG as above, pending cardiac evaluation  - CT-CARDIAC SCORING  - HOLTER - Cardiology Performed (48HR); Future  - NM-CARDIAC STRESS TEST; Future  - REFERRAL TO CARDIOLOGY General Cardiology MD  5. Palpitations  ER precautions discussed  - HOLTER - Cardiology Performed (48HR); Future  - NM-CARDIAC STRESS TEST; Future  - REFERRAL TO CARDIOLOGY General Cardiology MD    6. Dyslipidemia  Discussed treatment options, patient prefers low-calorie diet, daily exercise, weight control  - Lipid Profile; Future  - Comp Metabolic Panel; Future    7. Transaminitis  Borderline, follow-up labs  - GAMMA GT (GGT); Future    8. IFG (impaired fasting glucose)  Discussed about risk to develop DM.   Advised low carb diet, exercise, watch for WT.   - Comp Metabolic Panel; Future  -A1c, Future    9. Hypovitaminosis D  Advised to  double vitamin D supplement  - VITAMIN D,25 HYDROXY; Future    10. Health care maintenance  11. Needs flu shot  Information was provided to the patient regarding the vaccine, including side effects. Vaccine was given by my medical assistant under my supervision.  - Influenza Vaccine Quad Injection (PF)    Counseling:   - Smoking:  Nonsmoker    Followup: Return in about 6 months (around 4/23/2021) for LABS.    All questions are answered.    Please note that this dictation was created using voice recognition software, and that there might be errors of dung and possibly content.

## 2020-11-09 ENCOUNTER — TELEMEDICINE (OUTPATIENT)
Dept: MEDICAL GROUP | Age: 50
End: 2020-11-09
Payer: COMMERCIAL

## 2020-11-09 VITALS — TEMPERATURE: 97.5 F | HEIGHT: 64 IN | BODY MASS INDEX: 43.19 KG/M2 | WEIGHT: 253 LBS

## 2020-11-09 DIAGNOSIS — R73.01 IFG (IMPAIRED FASTING GLUCOSE): ICD-10-CM

## 2020-11-09 DIAGNOSIS — M79.89 SWELLING OF LOWER EXTREMITY: ICD-10-CM

## 2020-11-09 DIAGNOSIS — R74.01 TRANSAMINITIS: ICD-10-CM

## 2020-11-09 DIAGNOSIS — E55.9 HYPOVITAMINOSIS D: ICD-10-CM

## 2020-11-09 DIAGNOSIS — I10 ESSENTIAL HYPERTENSION: ICD-10-CM

## 2020-11-09 DIAGNOSIS — E78.5 DYSLIPIDEMIA: ICD-10-CM

## 2020-11-09 PROCEDURE — 99214 OFFICE O/P EST MOD 30 MIN: CPT | Mod: 95,CR | Performed by: INTERNAL MEDICINE

## 2020-11-09 RX ORDER — PHENTERMINE HYDROCHLORIDE 37.5 MG/1
37.5 TABLET ORAL
Qty: 30 TAB | Refills: 0 | Status: SHIPPED | OUTPATIENT
Start: 2020-11-09 | End: 2021-03-30 | Stop reason: SDUPTHER

## 2020-11-09 RX ORDER — POTASSIUM CHLORIDE 20 MEQ/1
20 TABLET, EXTENDED RELEASE ORAL DAILY
Qty: 90 TAB | Refills: 0 | Status: SHIPPED | OUTPATIENT
Start: 2020-11-09 | End: 2021-02-07

## 2020-11-09 ASSESSMENT — FIBROSIS 4 INDEX: FIB4 SCORE: 0.79

## 2020-11-09 NOTE — PROGRESS NOTES
Telemedicine Visit: Established Patient     This Remote Face to Face encounter was conducted via Zoom. Given the importance of social distancing and other strategies recommended to reduce the risk of COVID-19 transmission, I am providing medical care to this patient via audio/video visit in place of an in person visit at the request of the patient. Verbal consent to telehealth, risks, benefits, and consequences were discussed. Patient retains the right to withdraw at any time. All existing confidentiality protections apply. The patient has access to all transmitted medical information. No dissemination of any patient images or information to other entities without further written consent.    CHIEF COMPLAINT     Chief Complaint   Patient presents with   • Lab Results       HPI  Jessica Ham is a 50 y.o. female who presents today for the following     Hypertension, LE swelling  Meds: HCTZ 25 mg QD  C/o water retention, LE swelling.  Denies:  -  headaches, vision problems, tinnitus.                 -  chest pain/pressure, palpitations, irregular heart beats, exertional, dyspnea, peripheral edema.  Low salt diet: advised  Diet: advised low lester  Exercise: advised daily  BMI: 43  FH of HTN: mother    Cardiac risk factors:  Nonsmoker.  BMI: 43  Sedentary lifestyle.  Comorbidities: HTN, dyslipidemia, IFG  Positive FH of heart problems     Dyslipidemia, transaminitis  The patient had abnormal lipid panel, no medications.  Diet /Exercise/BMI:  As above  FH: mother  LFTs showed slight elevation of liver enzymes.     IFG  The patient had elevated FBG, and A1c.  No polydipsia, polyphagia, polyuria.  No abdominal pain, weight loss, fatigue.  Diet/exercise/BMI: As above  She was on metformin in the past.  FH of DM: multiple     Hypovitaminosis D  The patient had low vitamin D level.  Vitamin D supplement: 5000 U QD.    BMI 43  Interval course  -Start phentermine, November 2020    Onset: since her 20'  Diet:  regular  Exercise: insufficient  No temperature intolerance. No change in hair/skin quality, BMs.   No HTN, buffalo hump, purple striae, flushing.  FH of obesity: sisters x 2, mother    Reviewed PMH, PSH, FH, SH, ALL, HCM/IMM, no changes  Reviewed MEDS, no changes    Patient Active Problem List    Diagnosis Date Noted   • Health care maintenance 09/29/2020   • Immunization counseling 09/29/2020   • Essential hypertension 09/29/2020   • Dyslipidemia 09/29/2020   • IFG (impaired fasting glucose) 09/29/2020   • BMI 40.0-44.9, adult (HCC) 09/29/2020   • Hypovitaminosis D 09/29/2020     CURRENT MEDICATIONS  Current Outpatient Medications   Medication Sig Dispense Refill   • Cholecalciferol (VITAMIN D3 PO) Take 1,000 mg by mouth. Take 2 tablets a day     • hydroCHLOROthiazide (HYDRODIURIL) 25 MG Tab Take 1 Tab by mouth every day. 90 Tab 1   • fluticasone (FLONASE) 50 MCG/ACT nasal spray Spray 1 Spray in nose 2 times a day. 16 g 0     No current facility-administered medications for this visit.      ALLERGIES  Allergies: Patient has no known allergies.  PAST MEDICAL HISTORY  Past Medical History:   Diagnosis Date   • Allergy    • Anxiety    • Hypertension    • Overweight    • RAD (reactive airway disease)      SURGICAL HISTORY  She  has a past surgical history that includes abdominal hysterectomy total and primary c section.  SOCIAL HISTORY  Social History     Tobacco Use   • Smoking status: Never Smoker   • Smokeless tobacco: Never Used   Substance Use Topics   • Alcohol use: Not on file   • Drug use: Not on file     Social History     Social History Narrative   • Not on file     FAMILY HISTORY  Family History   Problem Relation Age of Onset   • Diabetes Mother    • Hypertension Mother    • Hyperlipidemia Mother    • Heart Disease Mother    • Diabetes Sister    • Diabetes Brother    • Heart Disease Father      Family Status   Relation Name Status   • Mo  (Not Specified)   • Sis  (Not Specified)   • Bro  (Not Specified)  "  • Fa  (Not Specified)     ROS   Constitutional: Negative for fever, chills, fatigue.  HENT: Negative for congestion, sore throat.  Eyes: Negative for vision problems.   Respiratory: Negative for cough, shortness of breath.  Cardiovascular: Negative for chest pain, palpitations.   Gastrointestinal: Negative for heartburn, nausea, abdominal pain.   Genitourinary: Negative for dysuria.  Musculoskeletal: Negative for significant myalgia, back and joint pain.   Skin: Negative for rash.   Neuro: Negative for dizziness, weakness and headaches.   Endo/Heme/Allergies: Does not bruise/bleed easily.   Psychiatric/Behavioral: Negative for depression.    Objective   Vitals obtained by patient:  Temperature 36.4 °C (97.5 °F)   Height 1.626 m (5' 4\")   Weight 114.8 kg (253 lb)   Body Mass Index 43.43 kg/m²   Physical Exam:  Constitutional: Alert, no distress, well-groomed.  Skin: No rash in visible areas.  Eye: Round. Conjunctiva clear, lids normal.  ENMT: Lips pink without lesions, good dentition. Phonation normal.  Neck: No visible masses or thyromegaly. Moves freely without pain.  CV: no peripheral cyanosis, tachycardia.  Respiratory: Unlabored respiratory effort, no cough or audible wheezing.  Psych: Alert and oriented x3, normal affect and mood.     Labs     Labs are reviewed and discussed with a patient  Lab Results   Component Value Date/Time    CHOLSTRLTOT 156 10/23/2020 08:36 AM    LDL 88 10/23/2020 08:36 AM    HDL 37 (A) 10/23/2020 08:36 AM    TRIGLYCERIDE 153 (H) 10/23/2020 08:36 AM       Lab Results   Component Value Date/Time    SODIUM 141 10/23/2020 08:36 AM    POTASSIUM 3.2 (L) 10/23/2020 08:36 AM    CHLORIDE 101 10/23/2020 08:36 AM    CO2 29 10/23/2020 08:36 AM    GLUCOSE 132 (H) 10/23/2020 08:36 AM    BUN 15 10/23/2020 08:36 AM    CREATININE 0.77 10/23/2020 08:36 AM     Lab Results   Component Value Date/Time    ALKPHOSPHAT 69 10/23/2020 08:36 AM    ASTSGOT 52 (H) 10/23/2020 08:36 AM    ALTSGPT 82 (H) " 10/23/2020 08:36 AM    TBILIRUBIN 1.1 10/23/2020 08:36 AM      Lab Results   Component Value Date/Time    WBC 7.8 10/03/2020 12:01 PM    RBC 5.31 10/03/2020 12:01 PM    HEMOGLOBIN 16.2 (H) 10/03/2020 12:01 PM    HEMATOCRIT 47.2 (H) 10/03/2020 12:01 PM    MCV 88.9 10/03/2020 12:01 PM    MCH 30.5 10/03/2020 12:01 PM    MCHC 34.3 10/03/2020 12:01 PM    MPV 10.2 10/03/2020 12:01 PM    NEUTSPOLYS 59.00 10/03/2020 12:01 PM    LYMPHOCYTES 26.90 10/03/2020 12:01 PM    MONOCYTES 11.30 10/03/2020 12:01 PM    EOSINOPHILS 1.50 10/03/2020 12:01 PM    BASOPHILS 0.90 10/03/2020 12:01 PM      Imaging      None    Assessment and Plan     Jessica Ham is a 50 y.o. female    1. Essential hypertension  Controlled, continue with current treatment.  - Comp Metabolic Panel; Future    2. Swelling of lower extremity  Controlled, continue with current treatment.    3. Dyslipidemia  Improved, borderline.  Advised low-calorie diet, daily exercise, weight control and started phentermine  - Comp Metabolic Panel; Future  - Lipid Profile; Future    4. Transaminitis  Borderline, negative GGT  - Comp Metabolic Panel; Future    5. IFG (impaired fasting glucose)  FBG increased, advised low-calorie diet, daily exercise, weight control, follow-up labs  - HEMOGLOBIN A1C; Future  - MICROALBUMIN CREAT RATIO URINE; Future  - Comp Metabolic Panel; Future    6. Hypovitaminosis D  Pending labs, continue current supplement  - VITAMIN D,25 HYDROXY; Future    7. BMI 40.0-44.9, adult ([facility])  Advised as above, and:  - phentermine (ADIPEX-P) 37.5 MG tablet; Take 1 Tab by mouth every morning before breakfast for 30 days.  Dispense: 30 Tab; Refill: 0    Obtained and reviewed patient utilization report from [facility] pharmacy database on 11/9/2020 7:59 AM  prior to writing prescription for controlled substance II, III or IV per [place] bill . Based on assessment of the report, the prescription is medically necessary.     Follow-up: In 3 months and as  needed

## 2021-02-07 RX ORDER — POTASSIUM CHLORIDE 20 MEQ/1
TABLET, EXTENDED RELEASE ORAL
Qty: 90 TAB | Refills: 4 | Status: SHIPPED | OUTPATIENT
Start: 2021-02-07 | End: 2021-03-30

## 2021-03-17 DIAGNOSIS — R05.9 COUGH: ICD-10-CM

## 2021-03-24 DIAGNOSIS — N64.4 BREAST PAIN, RIGHT: ICD-10-CM

## 2021-03-29 ENCOUNTER — HOSPITAL ENCOUNTER (OUTPATIENT)
Dept: RADIOLOGY | Facility: MEDICAL CENTER | Age: 51
End: 2021-03-29
Attending: INTERNAL MEDICINE
Payer: COMMERCIAL

## 2021-03-29 ENCOUNTER — HOSPITAL ENCOUNTER (OUTPATIENT)
Dept: LAB | Facility: MEDICAL CENTER | Age: 51
End: 2021-03-29
Attending: INTERNAL MEDICINE
Payer: COMMERCIAL

## 2021-03-29 DIAGNOSIS — R74.01 TRANSAMINITIS: ICD-10-CM

## 2021-03-29 DIAGNOSIS — R05.9 COUGH: ICD-10-CM

## 2021-03-29 DIAGNOSIS — I10 ESSENTIAL HYPERTENSION: ICD-10-CM

## 2021-03-29 DIAGNOSIS — E55.9 HYPOVITAMINOSIS D: ICD-10-CM

## 2021-03-29 DIAGNOSIS — R73.01 IFG (IMPAIRED FASTING GLUCOSE): ICD-10-CM

## 2021-03-29 DIAGNOSIS — E78.5 DYSLIPIDEMIA: ICD-10-CM

## 2021-03-29 PROCEDURE — 80053 COMPREHEN METABOLIC PANEL: CPT

## 2021-03-29 PROCEDURE — 83036 HEMOGLOBIN GLYCOSYLATED A1C: CPT

## 2021-03-29 PROCEDURE — 82043 UR ALBUMIN QUANTITATIVE: CPT

## 2021-03-29 PROCEDURE — 71046 X-RAY EXAM CHEST 2 VIEWS: CPT

## 2021-03-29 PROCEDURE — 80061 LIPID PANEL: CPT

## 2021-03-29 PROCEDURE — 82570 ASSAY OF URINE CREATININE: CPT

## 2021-03-29 PROCEDURE — 82306 VITAMIN D 25 HYDROXY: CPT

## 2021-03-29 PROCEDURE — 36415 COLL VENOUS BLD VENIPUNCTURE: CPT

## 2021-03-30 ENCOUNTER — OFFICE VISIT (OUTPATIENT)
Dept: MEDICAL GROUP | Age: 51
End: 2021-03-30
Payer: COMMERCIAL

## 2021-03-30 VITALS
HEART RATE: 75 BPM | TEMPERATURE: 98.1 F | WEIGHT: 243 LBS | SYSTOLIC BLOOD PRESSURE: 108 MMHG | OXYGEN SATURATION: 98 % | HEIGHT: 64 IN | DIASTOLIC BLOOD PRESSURE: 80 MMHG | BODY MASS INDEX: 41.48 KG/M2

## 2021-03-30 DIAGNOSIS — R73.01 IFG (IMPAIRED FASTING GLUCOSE): ICD-10-CM

## 2021-03-30 DIAGNOSIS — R07.9 CHEST PAIN, UNSPECIFIED TYPE: ICD-10-CM

## 2021-03-30 DIAGNOSIS — J98.11 ATELECTASIS: ICD-10-CM

## 2021-03-30 DIAGNOSIS — I10 ESSENTIAL HYPERTENSION: ICD-10-CM

## 2021-03-30 DIAGNOSIS — N63.0 BREAST LUMP: ICD-10-CM

## 2021-03-30 DIAGNOSIS — E78.5 DYSLIPIDEMIA: ICD-10-CM

## 2021-03-30 DIAGNOSIS — R74.01 TRANSAMINITIS: ICD-10-CM

## 2021-03-30 DIAGNOSIS — E87.6 HYPOKALEMIA: ICD-10-CM

## 2021-03-30 DIAGNOSIS — N64.4 BREAST PAIN, RIGHT: ICD-10-CM

## 2021-03-30 DIAGNOSIS — E55.9 HYPOVITAMINOSIS D: ICD-10-CM

## 2021-03-30 LAB
25(OH)D3 SERPL-MCNC: 41 NG/ML (ref 30–100)
ALBUMIN SERPL BCP-MCNC: 4.3 G/DL (ref 3.2–4.9)
ALBUMIN/GLOB SERPL: 1.4 G/DL
ALP SERPL-CCNC: 55 U/L (ref 30–99)
ALT SERPL-CCNC: 55 U/L (ref 2–50)
ANION GAP SERPL CALC-SCNC: 11 MMOL/L (ref 7–16)
AST SERPL-CCNC: 40 U/L (ref 12–45)
BILIRUB SERPL-MCNC: 0.9 MG/DL (ref 0.1–1.5)
BUN SERPL-MCNC: 16 MG/DL (ref 8–22)
CALCIUM SERPL-MCNC: 8.9 MG/DL (ref 8.5–10.5)
CHLORIDE SERPL-SCNC: 105 MMOL/L (ref 96–112)
CHOLEST SERPL-MCNC: 148 MG/DL (ref 100–199)
CO2 SERPL-SCNC: 26 MMOL/L (ref 20–33)
CREAT SERPL-MCNC: 0.69 MG/DL (ref 0.5–1.4)
CREAT UR-MCNC: 393.86 MG/DL
EST. AVERAGE GLUCOSE BLD GHB EST-MCNC: 108 MG/DL
GLOBULIN SER CALC-MCNC: 3.1 G/DL (ref 1.9–3.5)
GLUCOSE SERPL-MCNC: 94 MG/DL (ref 65–99)
HBA1C MFR BLD: 5.4 % (ref 4–5.6)
HDLC SERPL-MCNC: 36 MG/DL
LDLC SERPL CALC-MCNC: 89 MG/DL
MICROALBUMIN UR-MCNC: 2.5 MG/DL
MICROALBUMIN/CREAT UR: 6 MG/G (ref 0–30)
POTASSIUM SERPL-SCNC: 3.2 MMOL/L (ref 3.6–5.5)
PROT SERPL-MCNC: 7.4 G/DL (ref 6–8.2)
SODIUM SERPL-SCNC: 142 MMOL/L (ref 135–145)
TRIGL SERPL-MCNC: 116 MG/DL (ref 0–149)

## 2021-03-30 PROCEDURE — 99214 OFFICE O/P EST MOD 30 MIN: CPT | Performed by: INTERNAL MEDICINE

## 2021-03-30 RX ORDER — HYDROCHLOROTHIAZIDE 25 MG/1
25 TABLET ORAL DAILY
Qty: 90 TABLET | Refills: 4 | Status: SHIPPED | OUTPATIENT
Start: 2021-03-30 | End: 2022-06-05

## 2021-03-30 RX ORDER — PHENTERMINE HYDROCHLORIDE 37.5 MG/1
37.5 TABLET ORAL
Qty: 30 TABLET | Refills: 2 | Status: SHIPPED | OUTPATIENT
Start: 2021-03-30 | End: 2021-04-29

## 2021-03-30 RX ORDER — POTASSIUM CHLORIDE 20 MEQ/1
20 TABLET, EXTENDED RELEASE ORAL 2 TIMES DAILY
Qty: 180 TABLET | Refills: 3 | Status: SHIPPED | OUTPATIENT
Start: 2021-03-30

## 2021-03-30 ASSESSMENT — FIBROSIS 4 INDEX: FIB4 SCORE: 0.75

## 2021-03-30 ASSESSMENT — PATIENT HEALTH QUESTIONNAIRE - PHQ9: CLINICAL INTERPRETATION OF PHQ2 SCORE: 0

## 2021-03-30 NOTE — PROGRESS NOTES
CHIEF COMPLAINT  Chief Complaint   Patient presents with   • Lab Results,      HPI  Jessica Ham is a 50 y.o. female who presents today for the following     Hypertension, HypoK, CP/atelectasis  C/o intermittent CP on the bacl, accompanied with TTP.  Meds: HCTZ 25 mg QD, KCl 20 mEq QD  C/o water retention, LE swelling.  Denies:  -  headaches, vision problems, tinnitus.                 -  chest pain/pressure, palpitations, irregular heart beats, exertional, dyspnea, peripheral edema.  Low salt diet: advised  Diet: advised low lester  Exercise: advised daily  BMI: 41  FH of HTN: mother  Labs showed K at 3.2.     Cardiac risk factors:  Nonsmoker.  BMI: 43  Sedentary lifestyle.  Comorbidities: HTN, dyslipidemia, IFG  Positive FH of heart problems    CXR, 3/30/2021  Right infrahilar atelectasis with possible developing pneumonia  (no cough, fever/chills, sputum production).     Dyslipidemia, transaminitis  The patient had abnormal lipid panel, no medications.  Diet /Exercise/BMI:  As above  FH: mother  LFTs showed slight elevation of liver enzymes/normal GGT, improvine.     IFG  The patient had elevated FBG, and A1c.  No polydipsia, polyphagia, polyuria.  No abdominal pain, weight loss, fatigue.  Diet/exercise/BMI: As above  She was on metformin in the past.  FH of DM: multiple     Hypovitaminosis D  The patient had low vitamin D level.  Vitamin D supplement: 5000 U QD.     BMI 41, was 43  Interval course  -Start phentermine, November 2020     Onset: since her 20'  Diet: regular  Exercise: insufficient  No temperature intolerance. No change in hair/skin quality, BMs.   No HTN, buffalo hump, purple striae, flushing.  FH of obesity: sisters x 2, mother    Right breast pain, simple cyst LT breas  She developed RT breast pain / lump - lateral aspect.  Pain resolved, not sure if there is a lump.  FH of breast cancer:   - sister in late 40  - FGM    Last screening mammogram: 10/3/2020  There is an 11 mm circumscribed  mass in the upper outer quadrant of the left breast.    Ultrasound, 10/6/2020  Simple cyst upper outer quadrant left breast which explains the mammographic finding. Annual mammographic follow-up recommended.    Reviewed PMH, PSH, FH, SH, ALL, HCM/IMM, no changes  Reviewed MEDS, no changes    Patient Active Problem List    Diagnosis Date Noted   • Health care maintenance 09/29/2020   • Immunization counseling 09/29/2020   • Essential hypertension 09/29/2020   • Dyslipidemia 09/29/2020   • IFG (impaired fasting glucose) 09/29/2020   • BMI 40.0-44.9, adult (HCC) 09/29/2020   • Hypovitaminosis D 09/29/2020     CURRENT MEDICATIONS  Current Outpatient Medications   Medication Sig Dispense Refill   • potassium chloride SA (KDUR) 20 MEQ Tab CR TAKE 1 TABLET BY MOUTH EVERY DAY 90 Tab 4   • Cholecalciferol (VITAMIN D3 PO) Take 1,000 mg by mouth. Take 2 tablets a day     • hydroCHLOROthiazide (HYDRODIURIL) 25 MG Tab Take 1 Tab by mouth every day. 90 Tab 1   • fluticasone (FLONASE) 50 MCG/ACT nasal spray Spray 1 Spray in nose 2 times a day. 16 g 0     No current facility-administered medications for this visit.     ALLERGIES  Allergies: Patient has no known allergies.  PAST MEDICAL HISTORY  Past Medical History:   Diagnosis Date   • Allergy    • Anxiety    • Hypertension    • Overweight    • RAD (reactive airway disease)      SURGICAL HISTORY  She  has a past surgical history that includes abdominal hysterectomy total and primary c section.  SOCIAL HISTORY  Social History     Tobacco Use   • Smoking status: Never Smoker   • Smokeless tobacco: Never Used   Substance Use Topics   • Alcohol use: Not on file   • Drug use: Not on file     Social History     Social History Narrative   • Not on file     FAMILY HISTORY  Family History   Problem Relation Age of Onset   • Diabetes Mother    • Hypertension Mother    • Hyperlipidemia Mother    • Heart Disease Mother    • Diabetes Sister    • Diabetes Brother    • Heart Disease Father   "    Family Status   Relation Name Status   • Mo  (Not Specified)   • Sis  (Not Specified)   • Bro  (Not Specified)   • Fa  (Not Specified)       ROS   Constitutional: Negative for fever, chills, fatigue.  HENT: Negative for congestion, sore throat.  Eyes: Negative for vision problems.   Respiratory: Negative for cough, shortness of breath.  Cardiovascular: Negative for chest pain, palpitations.   Gastrointestinal: Negative for heartburn, nausea, abdominal pain.   Genitourinary: Negative for dysuria.  Musculoskeletal: Negative for significant myalgia, joint pain.   Skin: Negative for rash.   Neuro: Negative for dizziness, weakness and headaches.   Endo/Heme/Allergies: Does not bruise/bleed easily.   Psychiatric/Behavioral: Negative for depression.    PHYSICAL EXAM   Blood Pressure 108/80 (BP Location: Left arm, Patient Position: Sitting, BP Cuff Size: Adult)   Pulse 75   Temperature 36.7 °C (98.1 °F) (Temporal)   Height 1.626 m (5' 4\")   Weight 110 kg (243 lb)   Oxygen Saturation 98%   Body Mass Index 41.71 kg/m²   General:  NAD, well appearing  HEENT:   NC/AT, PERRLA, EOMI.  Cardiovascular: unlabored breathing, no peripheral cyanosis or swelling.  Lungs:   no respiratory distress.  Breasts examined seated and supine. No skin changes, peau d'orange or nipple retraction. No axillary or supraclavicular adenopathy.   Both breast are lumpy, with possible mas/gland tissue at 9 o'clock, 2 x 2 cm, movable, tender to palpation.  Abdomen: non- distended.  Extremities:  No LE swelling.  Skin:  Warm, dry.  No erythema. No rash.   Neurologic: Alert & oriented x 3. CN II-XII grossly intact. No focal deficits.  Psychiatric:  Affect normal, mood normal, judgment normal.    Labs     Labs are reviewed and discussed with a patient  Lab Results   Component Value Date/Time    CHOLSTRLTOT 148 03/29/2021 10:30 AM    LDL 89 03/29/2021 10:30 AM    HDL 36 (A) 03/29/2021 10:30 AM    TRIGLYCERIDE 116 03/29/2021 10:30 AM       Lab Results "   Component Value Date/Time    SODIUM 142 03/29/2021 10:30 AM    POTASSIUM 3.2 (L) 03/29/2021 10:30 AM    CHLORIDE 105 03/29/2021 10:30 AM    CO2 26 03/29/2021 10:30 AM    GLUCOSE 94 03/29/2021 10:30 AM    BUN 16 03/29/2021 10:30 AM    CREATININE 0.69 03/29/2021 10:30 AM     Lab Results   Component Value Date/Time    ALKPHOSPHAT 55 03/29/2021 10:30 AM    ASTSGOT 40 03/29/2021 10:30 AM    ALTSGPT 55 (H) 03/29/2021 10:30 AM    TBILIRUBIN 0.9 03/29/2021 10:30 AM      No results found for: HBA1C  No results found for: TSH  No results found for: FREET4    Lab Results   Component Value Date/Time    WBC 7.8 10/03/2020 12:01 PM    RBC 5.31 10/03/2020 12:01 PM    HEMOGLOBIN 16.2 (H) 10/03/2020 12:01 PM    HEMATOCRIT 47.2 (H) 10/03/2020 12:01 PM    MCV 88.9 10/03/2020 12:01 PM    MCH 30.5 10/03/2020 12:01 PM    MCHC 34.3 10/03/2020 12:01 PM    MPV 10.2 10/03/2020 12:01 PM    NEUTSPOLYS 59.00 10/03/2020 12:01 PM    LYMPHOCYTES 26.90 10/03/2020 12:01 PM    MONOCYTES 11.30 10/03/2020 12:01 PM    EOSINOPHILS 1.50 10/03/2020 12:01 PM    BASOPHILS 0.90 10/03/2020 12:01 PM     Imaging     Reviewed and discussed per HPI    Assessment and Plan     Jessica Ham is a 50 y.o. female    1. Essential hypertension  Controlled, continue with current treatment.  - Comp Metabolic Panel; Standing  - hydroCHLOROthiazide (HYDRODIURIL) 25 MG Tab; Take 1 tablet by mouth every day.  Dispense: 90 tablet; Refill: 4    2. Hypokalemia  Increase supplement from 20 - 40 mEq QD  - Comp Metabolic Panel; Standing    3. Chest pain, unspecified type  She has tenderness to palpation over the back muscles in the thoracolumbar area  - DX-CHEST-2 VIEWS; Future  4. Atelectasis  - DX-CHEST-2 VIEWS; Future - in 4 weeks    5. Dyslipidemia  Controlled, continue with current treatment.  - Comp Metabolic Panel; Standing    6. Transaminitis  Improved, follow-up labs  - Comp Metabolic Panel; Standing    7. IFG (impaired fasting glucose)  Discussed about risk to  develop DM.   Advised low carb diet, exercise, watch for WT.   - Comp Metabolic Panel; Standing  - HEMOGLOBIN A1C; Future    8. Hypovitaminosis D  Continue current supplement, follow-up labs  - VITAMIN D,25 HYDROXY; Standing    9. BMI 40.0-44.9, adult ([facility])  Congratulated for weight loss, advised to continue current lifestyle  - phentermine (ADIPEX-P) 37.5 MG tablet; Take 1 tablet by mouth every morning before breakfast for 30 days.  Dispense: 30 tablet; Refill: 2    10. Breast pain, right  Pending imaging  - US-BREAST LIMITED-RIGHT; Future  - MA DIAGNOSTIC MAMMO RIGHT W/CAD; Future  11. Breast lump  - US-BREAST LIMITED-RIGHT; Future  - MA DIAGNOSTIC MAMMO RIGHT W/CAD; Future    Counseling:   - Smoking:  Nonsmoker    Followup: in 6 months with labs    All questions are answered.    Please note that this dictation was created using voice recognition software, and that there might be errors of dung and possibly content.

## 2021-04-01 ENCOUNTER — APPOINTMENT (OUTPATIENT)
Dept: RADIOLOGY | Facility: MEDICAL CENTER | Age: 51
End: 2021-04-01
Attending: INTERNAL MEDICINE
Payer: COMMERCIAL

## 2021-04-05 ENCOUNTER — HOSPITAL ENCOUNTER (OUTPATIENT)
Dept: RADIOLOGY | Facility: MEDICAL CENTER | Age: 51
End: 2021-04-05
Attending: INTERNAL MEDICINE
Payer: COMMERCIAL

## 2021-04-05 DIAGNOSIS — N64.4 BREAST PAIN, RIGHT: ICD-10-CM

## 2021-04-05 DIAGNOSIS — R07.9 CHEST PAIN, UNSPECIFIED TYPE: ICD-10-CM

## 2021-04-05 DIAGNOSIS — N63.0 BREAST LUMP: ICD-10-CM

## 2021-04-05 PROCEDURE — G0279 TOMOSYNTHESIS, MAMMO: HCPCS | Mod: RT

## 2021-04-05 PROCEDURE — 76642 ULTRASOUND BREAST LIMITED: CPT | Mod: RT

## 2021-04-06 ENCOUNTER — HOSPITAL ENCOUNTER (OUTPATIENT)
Dept: RADIOLOGY | Facility: MEDICAL CENTER | Age: 51
End: 2021-04-06
Attending: INTERNAL MEDICINE
Payer: COMMERCIAL

## 2021-04-06 DIAGNOSIS — N64.4 BREAST PAIN, RIGHT: ICD-10-CM

## 2021-04-06 DIAGNOSIS — R92.8 ABNORMAL FINDINGS ON DIAGNOSTIC IMAGING OF BREAST: ICD-10-CM

## 2021-04-06 LAB — PATHOLOGY CONSULT NOTE: NORMAL

## 2021-04-06 PROCEDURE — 88305 TISSUE EXAM BY PATHOLOGIST: CPT

## 2021-04-06 PROCEDURE — 19083 BX BREAST 1ST LESION US IMAG: CPT | Mod: RT

## 2021-04-07 ENCOUNTER — TELEPHONE (OUTPATIENT)
Dept: RADIOLOGY | Facility: MEDICAL CENTER | Age: 51
End: 2021-04-07

## 2021-04-27 ENCOUNTER — IMMUNIZATION (OUTPATIENT)
Dept: FAMILY PLANNING/WOMEN'S HEALTH CLINIC | Facility: IMMUNIZATION CENTER | Age: 51
End: 2021-04-27
Payer: COMMERCIAL

## 2021-04-27 DIAGNOSIS — Z23 ENCOUNTER FOR VACCINATION: Primary | ICD-10-CM

## 2021-04-27 PROCEDURE — 91300 PFIZER SARS-COV-2 VACCINE: CPT

## 2021-04-27 PROCEDURE — 0001A PFIZER SARS-COV-2 VACCINE: CPT

## 2021-05-21 ENCOUNTER — IMMUNIZATION (OUTPATIENT)
Dept: FAMILY PLANNING/WOMEN'S HEALTH CLINIC | Facility: IMMUNIZATION CENTER | Age: 51
End: 2021-05-21
Payer: COMMERCIAL

## 2021-05-21 DIAGNOSIS — Z23 ENCOUNTER FOR VACCINATION: Primary | ICD-10-CM

## 2021-05-21 PROCEDURE — 0002A PFIZER SARS-COV-2 VACCINE: CPT

## 2021-05-21 PROCEDURE — 91300 PFIZER SARS-COV-2 VACCINE: CPT

## 2022-06-05 DIAGNOSIS — I10 ESSENTIAL HYPERTENSION: ICD-10-CM

## 2022-06-05 RX ORDER — HYDROCHLOROTHIAZIDE 25 MG/1
25 TABLET ORAL DAILY
Qty: 30 TABLET | Refills: 0 | Status: SHIPPED | OUTPATIENT
Start: 2022-06-05